# Patient Record
Sex: MALE | Race: BLACK OR AFRICAN AMERICAN | URBAN - METROPOLITAN AREA
[De-identification: names, ages, dates, MRNs, and addresses within clinical notes are randomized per-mention and may not be internally consistent; named-entity substitution may affect disease eponyms.]

---

## 2024-05-17 ENCOUNTER — OFFICE VISIT (OUTPATIENT)
Age: 59
End: 2024-05-17
Payer: COMMERCIAL

## 2024-05-17 VITALS — WEIGHT: 240 LBS | BODY MASS INDEX: 33.6 KG/M2 | HEIGHT: 71 IN

## 2024-05-17 DIAGNOSIS — E78.00 HIGH CHOLESTEROL: ICD-10-CM

## 2024-05-17 DIAGNOSIS — M17.12 OSTEOARTHRITIS OF LEFT KNEE, UNSPECIFIED OSTEOARTHRITIS TYPE: Primary | ICD-10-CM

## 2024-05-17 DIAGNOSIS — Z01.818 PRE-OP TESTING: ICD-10-CM

## 2024-05-17 PROCEDURE — 99204 OFFICE O/P NEW MOD 45 MIN: CPT | Performed by: ORTHOPAEDIC SURGERY

## 2024-05-17 RX ORDER — TRIAMCINOLONE ACETONIDE 40 MG/ML
40 INJECTION, SUSPENSION INTRA-ARTICULAR; INTRAMUSCULAR ONCE
Status: DISCONTINUED | OUTPATIENT
Start: 2024-05-17 | End: 2024-05-21

## 2024-05-17 RX ORDER — LIDOCAINE HYDROCHLORIDE 10 MG/ML
9 INJECTION, SOLUTION INFILTRATION; PERINEURAL ONCE
Status: DISCONTINUED | OUTPATIENT
Start: 2024-05-17 | End: 2024-05-21

## 2024-05-17 NOTE — PROGRESS NOTES
quadriceps weakness     Patient has failed conservative treatments including cortisone injections & home exercise programs. Patient is not able to walk distances longer than 15 minutes for over 3 months now. Patient is also unable to do any squatting or kneeling which has impacted daily living activities.Due to the severe nature of patients osteoarthritis and limited knee function the patient is unable to complete a formal physical therapy program due to pain severity, this also would have no benefit for the patient with grade 4 Kellgren-Brett.    Of note: This encounter is not finalizing the scheduling of any type of surgery.  Rather further diagnostic workup and clearances/ancillary workup will be required prior to scheduling the surgery.  All those documents will be reviewed and then a final decision on a follow-up appointment will be made regarding proceeding with surgery.  Patient has been made aware.    Encounter Diagnoses   Name Primary?    Osteoarthritis of left knee, unspecified osteoarthritis type Yes    Pre-op testing     High cholesterol             HPI:  The patient is here with a chief complaint of left knee pain, throbbing, burning pain, diagnosed with osteoarthritis.  Pain is 8/10.    Assessment/Plan:  Plan at this point, left knee total knee replacement.    We will get him optimized for high cholesterol with his family doctor.  [facility].  Injections have not helped.  He is modifying his activities of daily living and we will go from there.  If he gets worse, he is to give me a call.    As part of continued conservative pain management options the patient was advised to utilize Tylenol or OTC NSAIDS as long as it is not medically contraindicated.     Return to Office:    Follow-up and Dispositions    Return for SCHEDULE FOR SURGERY.        Scribed by Karina Gage LPN as dictated by Chandler Corral MD.  Documentation, performed by, True and Accepted Chandler Corral MD